# Patient Record
Sex: FEMALE | Race: BLACK OR AFRICAN AMERICAN | Employment: UNEMPLOYED | URBAN - METROPOLITAN AREA
[De-identification: names, ages, dates, MRNs, and addresses within clinical notes are randomized per-mention and may not be internally consistent; named-entity substitution may affect disease eponyms.]

---

## 2021-01-29 ENCOUNTER — OFFICE VISIT (OUTPATIENT)
Dept: PRIMARY CARE CLINIC | Age: 26
End: 2021-01-29

## 2021-01-29 VITALS
OXYGEN SATURATION: 99 % | TEMPERATURE: 98.4 F | SYSTOLIC BLOOD PRESSURE: 109 MMHG | WEIGHT: 140 LBS | HEIGHT: 69 IN | HEART RATE: 68 BPM | DIASTOLIC BLOOD PRESSURE: 75 MMHG | BODY MASS INDEX: 20.73 KG/M2

## 2021-01-29 DIAGNOSIS — M54.2 NECK PAIN ON RIGHT SIDE: Primary | ICD-10-CM

## 2021-01-29 PROCEDURE — 99202 OFFICE O/P NEW SF 15 MIN: CPT | Performed by: INTERNAL MEDICINE

## 2021-01-29 NOTE — LETTER
[address] [address]  Phone: [phone]  Fax: [phone]    Renetta Alvarenga MD        January 29, 2021     Patient: Sriram Aly   YOB: 1995   Date of Visit: 1/29/2021       To Whom it May Concern:    Boone Iglesias was seen in my clinic on 1/29/2021. If you have any questions or concerns, please don't hesitate to call.     Sincerely,         Renetta Alvarenga MD

## 2021-01-29 NOTE — PROGRESS NOTES
[address] [facility] [address] [address]  Dept: [phone]  Dept Fax: [phone]    Joslyn Schwartz is a 31 y.o. adult who presents to the urgent care today for her medicalconditions/complaints as noted below. Joslyn Schwartz is c/o of Otalgia (pain behind RT ear that happens every 2-3 mins. pt says that it states at the back of the ear and then shoots down. )      HPI:     Otalgia   There is pain in the right ear. This is a new problem. The current episode started in the past 7 days (2 days). The problem occurs constantly. The problem has been unchanged. There has been no fever. She has tried NSAIDs for the symptoms. The treatment provided moderate relief. Past Medical History:   Diagnosis Date    Asthma         No current outpatient medications on file. No current facility-administered medications for this visit. No Known Allergies    Health Maintenance   Topic Date Due    Hepatitis C screen  1995    Varicella vaccine (1 of 2 - 2-dose childhood series) 06/07/1995    HPV vaccine (1 - 2-dose series) 06/07/2005    HIV screen  06/07/2009    DTaP/Tdap/Td vaccine (1 - Tdap) 06/07/2013    Flu vaccine (1) 09/01/2020    Hepatitis A vaccine  Aged Out    Hepatitis B vaccine  Aged Out    Hib vaccine  Aged Out    Meningococcal (ACWY) vaccine  Aged Out    Pneumococcal 0-64 years Vaccine  Aged Out       Subjective:      Review of Systems   HENT: Positive for ear pain. All other systems reviewed and are negative. Objective:     Physical Exam  Vitals signs reviewed. Constitutional:       Appearance: Normal appearance. HENT:      Head: Normocephalic and atraumatic. Right Ear: Tympanic membrane and ear canal normal.      Left Ear: Tympanic membrane and ear canal normal.   Skin:     General: Skin is warm and dry. Neurological:      General: No focal deficit present.       Mental Status: She is alert and oriented to person, place, and time. Psychiatric:         Mood and Affect: Mood normal.         Behavior: Behavior normal.       /75   Pulse 68   Temp 98.4 °F (36.9 °C)   Ht 5' 9\" (1.753 m)   Wt 140 lb (63.5 kg)   SpO2 99%   BMI 20.67 kg/m²       Assessment:       Diagnosis Orders   1. Neck pain on right side         Plan:      No follow-ups on file. No orders of the defined types were placed in this encounter. No orders of the defined types were placed in this encounter. Patient given educational materials - see patient instructions. Discussed use, benefit, and side effects of prescribed medications. All patientquestions answered. Pt voiced understanding.     Electronically signed by Augustus Reyes MD on 1/29/2021at 12:24 PM